# Patient Record
(demographics unavailable — no encounter records)

---

## 2025-05-06 NOTE — PHYSICAL EXAM
[FreeTextEntry1] : morbidly obese [de-identified] : thick, no masses/lesions [Nasal Endoscopy Performed] : nasal endoscopy was performed, see procedure section for findings [Midline] : trachea located in midline position [de-identified] : crowded opx [Normal] : no rashes

## 2025-05-06 NOTE — ASSESSMENT
[FreeTextEntry1] : 65F w chronic nasal congestion/obstruction and midface pain. Topical medications have been ineffective. Exam shows turbinate hypertrophy and postnasal drip. Likely w chronic rhinitis - will get CT sinus for baseline given length of time of sx and RTO thereafter for review.

## 2025-05-06 NOTE — HISTORY OF PRESENT ILLNESS
[de-identified] : 65F here for initial evaluation.  For years, she c/o pain over her face in addition to severe nasal congestion/obstruction. She always feels her nose is 'blocked.' Both sides are affected the same and sx persist t/o the day. She is on saline, flonase/astelin but sx persist. No h/o sinusitis, no asthma. HALLIE, on CPAP.  ROS otherwise unremarkable.

## 2025-05-06 NOTE — REVIEW OF SYSTEMS
aggressive behavior [Patient Intake Form Reviewed] : Patient intake form was reviewed [As Noted in HPI] : as noted in HPI [Negative] : Heme/Lymph

## 2025-05-06 NOTE — PROCEDURE
[FreeTextEntry3] : Nasal Endoscopy: moderately severe inferior turbinate hypertrophy middle meati grossly clear clear postnasal drip

## 2025-05-15 NOTE — DISCUSSION/SUMMARY
[de-identified] : IMP: 65-year-old female with severe bilateral knee and severe left worse than right-hip osteoarthritis; in the setting of morbid obesity. - She would stand to benefit from staged bilateral total hip and knee replacements, most likely starting with the left knee first.  - She is not currently a candidate for surgery, secondary to her hyperobesity.  - I did recommend that she do her best to continue with weight loss through diet and exercise. - She declined an offer for bariatric surgery consultation. - I did, as an alternative, recommend that she discuss GLP-1 medications with her primary care doctor and potentially start on that as quickly as possible.  - We will administer bilateral knee monovisc injections for her today. - We'll set the next follow-up for three months with no new x-rays needed.  She should be reweighed upon every visit.

## 2025-05-15 NOTE — END OF VISIT
[FreeTextEntry3] : Documented by Kathy Sidhu acting as a scribe for Dr. Yves Cadet. 05/14/2025  All medical record entries made by the Scribe were at my, Dr. Yves Cadet, direction and personally dictated by me on 05/14/2025. I have reviewed the chart and agree that the record accurately reflects my personal performance of the history, physical exam, assessment and plan. I have also personally directed, reviewed, and agreed with the chart.

## 2025-05-15 NOTE — PROCEDURE
[de-identified] : MonoVisc injection - Bilateral knee joints Lot #: [id number] Exp: 09- Man: [employer]lic NDC: [id number]  Procedure: Knee joint injection Laterality: B/L Indication: Osteoarthritis - discussed with patient Skin prep: alcohol and chlorhexidine Anesthesia: ethyl chloride spray Needle: 20-gauge Portal: superolateral Aspiration: none Injectate: monovisc Dressing: Band-aid Complications: None

## 2025-05-15 NOTE — HISTORY OF PRESENT ILLNESS
[de-identified] : 05/14/2025:  65-year-old female with bilateral knee osteoarthritis who presents for evaluation.  Patient reports that she's been having pain in her bilateral knees for several years. Her left knee pain is worse than her right. She states that the pain is a ten out of ten in severity--it's located globally throughout the knee and radiates posteriorly. The pain is constant but worse with walking, standing, going up and downstairs, bending. She states that in order to treat the pain, she's used rest as well as NSAIDs. She's currently using Naproxen, which only helps to a degree. She's gotten corticosteroid injections in the past, most recently in March by her rheumatologist. She states that the injection only provided her about one to two weeks of relief, and wore it off after that. She has been doing some physical therapy for her knees and she states that she's only done two sessions so far recently, but is just starting her course of therapy. She states that she has been attempting weight loss with a self-directed diet program since her correction in 2017. She states that she lost about 45 pounds over this period of time, but has plateaued recently. She states that the pain in her knees is significantly affecting her ability to ambulate and carry out her activities of daily living.

## 2025-05-15 NOTE — REASON FOR VISIT
[Initial Consultation] : an initial consultation for [FreeTextEntry2] : bilateral knee pain and stiffness. L > R

## 2025-05-15 NOTE — HISTORY OF PRESENT ILLNESS
[de-identified] : 05/14/2025:  65-year-old female with bilateral knee osteoarthritis who presents for evaluation.  Patient reports that she's been having pain in her bilateral knees for several years. Her left knee pain is worse than her right. She states that the pain is a ten out of ten in severity--it's located globally throughout the knee and radiates posteriorly. The pain is constant but worse with walking, standing, going up and downstairs, bending. She states that in order to treat the pain, she's used rest as well as NSAIDs. She's currently using Naproxen, which only helps to a degree. She's gotten corticosteroid injections in the past, most recently in March by her rheumatologist. She states that the injection only provided her about one to two weeks of relief, and wore it off after that. She has been doing some physical therapy for her knees and she states that she's only done two sessions so far recently, but is just starting her course of therapy. She states that she has been attempting weight loss with a self-directed diet program since her detention in 2017. She states that she lost about 45 pounds over this period of time, but has plateaued recently. She states that the pain in her knees is significantly affecting her ability to ambulate and carry out her activities of daily living.

## 2025-05-15 NOTE — PHYSICAL EXAM
[de-identified] : General appearance: Patient demonstrates a markedly increased BMI.  well nourished and hydrated, pleasant, alert and oriented x 3, cooperative.   HEENT: normocephalic, EOM intact, wearing mask, external auditory canal clear.   Cardiovascular: no lower leg edema, no varicosities, dorsalis pedis pulses palpable and symmetric.   Lymphatics: no palpable lymphadenopathy, no lymphedema.   Neurologic: sensation is normal, no muscle weakness in upper or lower extremities, patella tendon reflexes present and symmetric.   Dermatologic: skin moist, warm, no rash.   Spine: cervical spine with normal lordosis and painless range of motion, thoracic spine with normal kyphosis and painless range of motion, lumbosacral spine with normal lordosis and painless range of motion.  No tenderness to palpation along midline spine and paraspinal musculature.  Sacroiliac joints nontender bilaterally. Negative SLR and crossed SLR tests bilaterally. Gait: She presents with no assistive device. She transitions cautiously from seated to standing. She demonstrates a cautious gait pattern with mild left-sided antalgia, she has a mild left varus thrust pattern.  Left knee:  - Focal soft tissue swelling: none - Baker cyst: No palpable Baker's cysts - Ecchymosis: none - Erythema: none - Effusion: small - Wounds: Well healed arthoscopic portals, benign apperaing. - Alignment: normal - Tenderness: medial and lateral joint line tenderness to palpation. She has crepitus but no pain with patellar compression test. - ROM: 5-90 - Collateral laxity: demostrate increased puesdo laxity to varus - Cruciate laxity: stable - Popliteal angle (degrees): 25 - Quad strength: 5/5 - Extensor lag: none  Right knee: - Focal soft tissue swelling: none - Baker cyst: No palpable Baker's cysts - Ecchymosis: none - Erythema: none - Effusion: trace - Wounds: none - Alignment: normal - Tenderness: medial and lateral joint line tenderness to palpation. She has pain and crepitus with patellar compression test. - ROM: 10-90 - Collateral laxity: stable - Cruciate laxity: stable - Popliteal angle (degrees): 30 - Quad strength: 5/5 - Extensor lag: none  Limb lengths: Clinically, difficult to assess secondary to asymmetric knee flexion contractures. My impression is that the right lower extremity is approximately 3-5 millimeters longer than left.  Left hip: - Focal soft tissue swelling: none - Ecchymosis: none - Erythema: none - Wounds: none - Tenderness: none - ROM:    - Flexion: 95   - Extension: 0 - Adduction: 10   - Abduction: 10   - Internal rotation in 90 degrees of hip flexion: 15 degrees oblique external.   - External rotation in 90 degrees of hip flexion: 30 - TYSON: stiff and painful - FADIR: stiff and painful - Laurie: negative - Stinchfield: positive - Flexor power: 4/5 - Abductor power: 3+/5  Right hip:  - Focal soft tissue swelling: none - Ecchymosis: none - Erythema: none - Wounds: none - Tenderness: none - ROM:    - Flexion: 90   - Extension: 0   - Adduction: 5   - Abduction: 15   - Internal rotation in 90 degrees of hip flexion: 5 degrees oblique external.   - External rotation in 90 degrees of hip flexion: 15 - TYSON: very stiff and elicits posterior buttock pain - FADIR: very stiff and  elicits posterior buttock pain - Laurie: negative - Stinchfield: positive - Flexor power: 4/5 - Abductor power: 5/5 [de-identified] : AP pelvis and 4 views of the bilateral knees (weightbearing AP, weightbearing Chappell, weightbearing lateral, and Sunrise) were interpreted by me and reviewed with the patient.  Location of imaging:  [facility] Date of exam: 05/14/2025  Pelvic alignment: slight outlet  Right hip --  Arthritis: Moderate to severe osteoarthritis with predominantly inferior joint space narrowing, TONNIS 2-3.  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 Osteonecrosis: none  Left hip --  Arthritis: severe osteoarthritis with bone-on-bone supra-lateral and infra-medial articulation, tonus three.  Deformity: coxa profunda deformity, coxa vera deformity and there's pincer deformity. Osteonecrosis: none  Right knee --  Alignment: mildly varus Arthritis:  tricompartmental osteoarthritis, bone-on-bone on both the medial and patellofemoral compartments, K&L 4.  Patellar height: normal Patellar tracking: centrally  Left knee --  Alignment: varus malalignment with lateral tibial subluxation.  Arthritis: tricompartmental osteoarthritis bone-on-bone on both the medial and patellofemoral compartments, K&L 4. Patellar height: normal Patellar tracking: centrally

## 2025-05-15 NOTE — PROCEDURE
[de-identified] : MonoVisc injection - Bilateral knee joints Lot #: [id number] Exp: 09- Man: [employer] NDC: [id number]  Procedure: Knee joint injection Laterality: B/L Indication: Osteoarthritis - discussed with patient Skin prep: alcohol and chlorhexidine Anesthesia: ethyl chloride spray Needle: 20-gauge Portal: superolateral Aspiration: none Injectate: monovisc Dressing: Band-aid Complications: None

## 2025-05-15 NOTE — DISCUSSION/SUMMARY
[de-identified] : IMP: 65-year-old female with severe bilateral knee and severe left worse than right-hip osteoarthritis; in the setting of morbid obesity. - She would stand to benefit from staged bilateral total hip and knee replacements, most likely starting with the left knee first.  - She is not currently a candidate for surgery, secondary to her hyperobesity.  - I did recommend that she do her best to continue with weight loss through diet and exercise. - She declined an offer for bariatric surgery consultation. - I did, as an alternative, recommend that she discuss GLP-1 medications with her primary care doctor and potentially start on that as quickly as possible.  - We will administer bilateral knee monovisc injections for her today. - We'll set the next follow-up for three months with no new x-rays needed.  She should be reweighed upon every visit.

## 2025-05-15 NOTE — PHYSICAL EXAM
[de-identified] : General appearance: Patient demonstrates a markedly increased BMI.  well nourished and hydrated, pleasant, alert and oriented x 3, cooperative.   HEENT: normocephalic, EOM intact, wearing mask, external auditory canal clear.   Cardiovascular: no lower leg edema, no varicosities, dorsalis pedis pulses palpable and symmetric.   Lymphatics: no palpable lymphadenopathy, no lymphedema.   Neurologic: sensation is normal, no muscle weakness in upper or lower extremities, patella tendon reflexes present and symmetric.   Dermatologic: skin moist, warm, no rash.   Spine: cervical spine with normal lordosis and painless range of motion, thoracic spine with normal kyphosis and painless range of motion, lumbosacral spine with normal lordosis and painless range of motion.  No tenderness to palpation along midline spine and paraspinal musculature.  Sacroiliac joints nontender bilaterally. Negative SLR and crossed SLR tests bilaterally. Gait: She presents with no assistive device. She transitions cautiously from seated to standing. She demonstrates a cautious gait pattern with mild left-sided antalgia, she has a mild left varus thrust pattern.  Left knee:  - Focal soft tissue swelling: none - Baker cyst: No palpable Baker's cysts - Ecchymosis: none - Erythema: none - Effusion: small - Wounds: Well healed arthoscopic portals, benign apperaing. - Alignment: normal - Tenderness: medial and lateral joint line tenderness to palpation. She has crepitus but no pain with patellar compression test. - ROM: 5-90 - Collateral laxity: demostrate increased puesdo laxity to varus - Cruciate laxity: stable - Popliteal angle (degrees): 25 - Quad strength: 5/5 - Extensor lag: none  Right knee: - Focal soft tissue swelling: none - Baker cyst: No palpable Baker's cysts - Ecchymosis: none - Erythema: none - Effusion: trace - Wounds: none - Alignment: normal - Tenderness: medial and lateral joint line tenderness to palpation. She has pain and crepitus with patellar compression test. - ROM: 10-90 - Collateral laxity: stable - Cruciate laxity: stable - Popliteal angle (degrees): 30 - Quad strength: 5/5 - Extensor lag: none  Limb lengths: Clinically, difficult to assess secondary to asymmetric knee flexion contractures. My impression is that the right lower extremity is approximately 3-5 millimeters longer than left.  Left hip: - Focal soft tissue swelling: none - Ecchymosis: none - Erythema: none - Wounds: none - Tenderness: none - ROM:    - Flexion: 95   - Extension: 0 - Adduction: 10   - Abduction: 10   - Internal rotation in 90 degrees of hip flexion: 15 degrees oblique external.   - External rotation in 90 degrees of hip flexion: 30 - TYSON: stiff and painful - FADIR: stiff and painful - Laurie: negative - Stinchfield: positive - Flexor power: 4/5 - Abductor power: 3+/5  Right hip:  - Focal soft tissue swelling: none - Ecchymosis: none - Erythema: none - Wounds: none - Tenderness: none - ROM:    - Flexion: 90   - Extension: 0   - Adduction: 5   - Abduction: 15   - Internal rotation in 90 degrees of hip flexion: 5 degrees oblique external.   - External rotation in 90 degrees of hip flexion: 15 - TYSON: very stiff and elicits posterior buttock pain - FADIR: very stiff and  elicits posterior buttock pain - Laurie: negative - Stinchfield: positive - Flexor power: 4/5 - Abductor power: 5/5 [de-identified] : AP pelvis and 4 views of the bilateral knees (weightbearing AP, weightbearing Chappell, weightbearing lateral, and Sunrise) were interpreted by me and reviewed with the patient.  Location of imaging:  [facility] Date of exam: 05/14/2025  Pelvic alignment: slight outlet  Right hip --  Arthritis: Moderate to severe osteoarthritis with predominantly inferior joint space narrowing, TONNIS 2-3.  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 Osteonecrosis: none  Left hip --  Arthritis: severe osteoarthritis with bone-on-bone supra-lateral and infra-medial articulation, tonus three.  Deformity: coxa profunda deformity, coxa vera deformity and there's pincer deformity. Osteonecrosis: none  Right knee --  Alignment: mildly varus Arthritis:  tricompartmental osteoarthritis, bone-on-bone on both the medial and patellofemoral compartments, K&L 4.  Patellar height: normal Patellar tracking: centrally  Left knee --  Alignment: varus malalignment with lateral tibial subluxation.  Arthritis: tricompartmental osteoarthritis bone-on-bone on both the medial and patellofemoral compartments, K&L 4. Patellar height: normal Patellar tracking: centrally

## 2025-06-12 NOTE — HISTORY OF PRESENT ILLNESS
[de-identified] : 65F here in followup  For years, she c/o severe nasal congestion/obstruction. She always feels her nose is 'blocked.' Both sides are affected the same and sx persist t/o the day. She is on saline, flonase/astelin but sx persist. No h/o sinusitis, no asthma. HALLIE, on CPAP.  CT Sinus 5/11/25 (I reviewed): -inferior turbinates hypertrophic and osteitic -bilateral debbie -right septal deviation -paranasal sinuses clear  ROS otherwise unremarkable.

## 2025-06-12 NOTE — PHYSICAL EXAM
[FreeTextEntry1] : morbidly obese [de-identified] : thick, no masses/lesions [Nasal Endoscopy Performed] : nasal endoscopy was performed, see procedure section for findings [Midline] : trachea located in midline position [de-identified] : crowded opx [Normal] : no rashes

## 2025-06-12 NOTE — DATA REVIEWED
[de-identified] : CT Sinus 5/11/25: INDINGS:  Sino cranial and sino-orbital junctions: The fovea ethmoidalis, cribriform plate and lamina papyracea are intact. The right fovea ethmoidalis is downsloping and lower in position than the left, an anatomic variant.  FRONTAL SINUSES:  Well developed and clear. Mild chronic bony thickening suggested particularly on the right. ETHMOID SINUSES:  Well developed and clear. MAXILLARY SINUSES:  Well developed and clear. SPHENOID SINUSES:  Well developed and clear.  RIGHT FRONTAL RECESS:  Patent. LEFT FRONTAL RECESS:  Patent. RIGHT OSTIOMEATAL UNIT:  Patent. LEFT OSTIOMEATAL UNIT:  Patent. SPHENOETHMOIDAL RECESSES: Mucosal thickening slightly narrows both sphenoid ostia. The remainder of the sphenoethmoidal recesses are grossly clear.  NASAL SEPTUM:  Rightward deviation. A small right-sided spur contacts the inferior turbinate. NASAL CAVITY/NASOPHARYNX:  No polypoid mass. Bilateral septated debbie bullosa.. POSTOP CHANGES:  Post right canal wall up mastoidectomy.  VISUALIZED INTRACRANIAL STRUCTURES:  Anterior communicating artery embolization coil. ORBITAL CONTENTS:  Bilateral proptosis.  IMPRESSION:  No fluid level or significant sinus opacification at this time.  Minimal mucosal thickening slightly narrows the sphenoid ostia. There is rightward nasal septal deviation in bilateral debbie bullosa as described.

## 2025-06-12 NOTE — ASSESSMENT
[FreeTextEntry1] : 65F w chronic nasal congestion/obstruction. Topical medications have been ineffective. CT shows turbinate hypertrophy and right septal deviation. Exam shows turbinate hypertrophy and postnasal drip. Nasal obstruction due to chronic rhinitis/turbinate hypertrophy. Despite right septal deviation, this is not contributing. Recommend turbinate reduction to improve nasal breathing. She would like to think about it. Until this, flonase/astelin daily.